# Patient Record
Sex: FEMALE | Race: WHITE
[De-identification: names, ages, dates, MRNs, and addresses within clinical notes are randomized per-mention and may not be internally consistent; named-entity substitution may affect disease eponyms.]

---

## 2017-01-31 ENCOUNTER — HOSPITAL ENCOUNTER (OUTPATIENT)
Dept: HOSPITAL 80 - FLD | Age: 32
Setting detail: OBSERVATION
Discharge: HOME | End: 2017-01-31
Attending: OBSTETRICS & GYNECOLOGY | Admitting: OBSTETRICS & GYNECOLOGY
Payer: MEDICAID

## 2017-01-31 DIAGNOSIS — Z3A.00: ICD-10-CM

## 2017-01-31 DIAGNOSIS — O21.9: Primary | ICD-10-CM

## 2017-01-31 LAB
ABSOLUTE NRBC COUNT: 0 10^3/UL (ref 0–0.01)
ADD DIFF?: YES
ADD MORPH?: NO
ADD SCAN?: NO
ANION GAP SERPL CALC-SCNC: 6 MEQ/L (ref 8–16)
ATYPICAL LYMPHOCYTE FLAG: 30 (ref 0–99)
CALCIUM SERPL-MCNC: 8.8 MG/DL (ref 8.5–10.4)
CHLORIDE SERPL-SCNC: 105 MEQ/L (ref 97–110)
CO2 SERPL-SCNC: 22 MEQ/L (ref 22–31)
CREAT SERPL-MCNC: 0.5 MG/DL (ref 0.6–1)
ERYTHROCYTE [DISTWIDTH] IN BLOOD BY AUTOMATED COUNT: 14.2 % (ref 11.5–15.2)
FRAGMENT RBC FLAG: 0 (ref 0–99)
GFR SERPL CREATININE-BSD FRML MDRD: > 60 ML/MIN/{1.73_M2}
GLUCOSE SERPL-MCNC: 72 MG/DL (ref 70–100)
HCT VFR BLD CALC: 36.3 % (ref 38–47)
HGB BLD-MCNC: 12.4 G/DL (ref 12.6–16.3)
LEFT SHIFT FLG: 20 (ref 0–99)
LIPEMIA HEMOLYSIS FLAG: 90 (ref 0–99)
MCH RBC BLDCO QN: 32.5 PG (ref 27.9–34.1)
MCHC RBC AUTO-ENTMCNC: 34.2 G/DL (ref 32.4–36.7)
MCV RBC AUTO: 95.3 FL (ref 81.5–99.8)
NRBC-AUTO%: 0 % (ref 0–0.2)
PLATELET # BLD EST: ADEQUATE 10*3/UL
PLATELET # BLD: 289 10^3/UL (ref 150–400)
PLATELET CLUMPS FLAG: 10 (ref 0–99)
PMV BLD AUTO: 11.3 FL (ref 8.7–11.7)
POTASSIUM SERPL-SCNC: 3.9 MEQ/L (ref 3.5–5.2)
RBC # BLD AUTO: 3.81 10^6/UL (ref 4.18–5.33)
RBC MORPH BLD: NORMAL
SODIUM SERPL-SCNC: 133 MEQ/L (ref 134–144)

## 2017-01-31 PROCEDURE — G0378 HOSPITAL OBSERVATION PER HR: HCPCS

## 2017-02-15 ENCOUNTER — HOSPITAL ENCOUNTER (INPATIENT)
Dept: HOSPITAL 80 - FLD | Age: 32
LOS: 2 days | Discharge: HOME | End: 2017-02-17
Attending: OBSTETRICS & GYNECOLOGY | Admitting: OBSTETRICS & GYNECOLOGY
Payer: MEDICAID

## 2017-02-15 DIAGNOSIS — O09.43: ICD-10-CM

## 2017-02-15 DIAGNOSIS — Z3A.39: ICD-10-CM

## 2017-02-15 LAB
ABSOLUTE NRBC COUNT: 0 10^3/UL (ref 0–0.01)
ADD DIFF?: YES
ADD MORPH?: NO
ADD SCAN?: NO
ATYPICAL LYMPHOCYTE FLAG: 10 (ref 0–99)
ERYTHROCYTE [DISTWIDTH] IN BLOOD BY AUTOMATED COUNT: 14.2 % (ref 11.5–15.2)
FRAGMENT RBC FLAG: 0 (ref 0–99)
HCT VFR BLD CALC: 38.6 % (ref 38–47)
HGB BLD-MCNC: 13.1 G/DL (ref 12.6–16.3)
LEFT SHIFT FLG: 10 (ref 0–99)
LG PLATELETS BLD QL SMEAR: PRESENT
LIPEMIA HEMOLYSIS FLAG: 90 (ref 0–99)
MACROCYTES: (no result)
MCH RBC BLDCO QN: 32.5 PG (ref 27.9–34.1)
MCHC RBC AUTO-ENTMCNC: 33.9 G/DL (ref 32.4–36.7)
MCV RBC AUTO: 95.8 FL (ref 81.5–99.8)
NRBC-AUTO%: 0 % (ref 0–0.2)
PLATELET # BLD EST: ADEQUATE 10*3/UL
PLATELET # BLD: 357 10^3/UL (ref 150–400)
PLATELET CLUMPS FLAG: 10 (ref 0–99)
PMV BLD AUTO: 11.2 FL (ref 8.7–11.7)
RBC # BLD AUTO: 4.03 10^6/UL (ref 4.18–5.33)

## 2017-02-15 RX ADMIN — IBUPROFEN PRN MG: 600 TABLET ORAL at 19:21

## 2017-02-15 RX ADMIN — IBUPROFEN PRN MG: 600 TABLET ORAL at 11:57

## 2017-02-15 RX ADMIN — DOCUSATE SODIUM PRN MG: 100 CAPSULE, LIQUID FILLED ORAL at 19:21

## 2017-02-15 RX ADMIN — HYDROCODONE BITARTRATE AND ACETAMINOPHEN PRN TAB: 5; 325 TABLET ORAL at 19:21

## 2017-02-15 RX ADMIN — HYDROCODONE BITARTRATE AND ACETAMINOPHEN PRN TAB: 5; 325 TABLET ORAL at 06:16

## 2017-02-15 RX ADMIN — IBUPROFEN PRN MG: 600 TABLET ORAL at 06:16

## 2017-02-15 RX ADMIN — HYDROCODONE BITARTRATE AND ACETAMINOPHEN PRN TAB: 5; 325 TABLET ORAL at 11:57

## 2017-02-15 NOTE — OBPROC
- Labor and Delivery


Onset of Contractions Date: 02/15/17


Onset of Contractions Time: 03:00


Onset of Contractions Type: Spontaneous


Rupture of Membranes Date: 02/15/17


Rupture of Membranes Time: 03:00


Rupture of Membranes Type: Spontaneous


Amniotic Fluid Color: Clear


Dilation Complete Time: 05:12


Delivery Type: Spontaneous


Placenta Delivery Date: 02/15/17


Placenta Delivery Time: 05:40


Episiotomy/Laceration: Other (Specify) (none)


EBL: 400


Complications: None





- Medications


Labor Augmentation/Induction Meds Used: None


Anesthesia: Other (Specify) (none)





-  Info


  ** Infant A


Delivery Date: 02/15/17


Delivery Time: 05:26


Sex of Infant: Male (Michael)


Apgar Score (1 Min): 8


Apgar Score (5 Min): 9

## 2017-02-15 NOTE — GHP
[f 
rep st]



                                                            HISTORY AND PHYSICAL





DATE OF ADMISSION:  02/15/2017 on the obstetrics service.  



HISTORY UPON ADMISSION:  The patient is a 31-year-old G7, P5, A1, who presented 
to Labor and Delivery completely dilated after rapid progression of labor.  The 
patient had spontaneous onset of contractions that she states were pretty mild 
at approximately midnight.  They really increased in intensity approximately 3 
a.m.  The patient remembers having small leakage with coughing at home; however
, no abrupt rupture of membranes.  The intensity increases approximately 3 a.m. 
likely with spontaneous rupture of membranes.  The patient was having some 
bloody show upon admission as well.  On initial exam, the patient was 
completely dilated at +3 station.  Preparations were made for immediate 
delivery.



PREGNANCY COURSE:  The patient has been with [facility] since 11 weeks
' gestation.  The patient's pregnancy has been bothered quite a bit by nausea 
and vomiting, for which the patient has taken Diclegis and Zofran throughout 
much of the pregnancy.  The patient had a 20-week ultrasound, which showed an 
echogenic focus in the left ventricle; however, the patient had verified 
testing and this was negative.  The patient was noted to be anemic in mid 
pregnancy and was advised to take iron; however, did not initiate that.  Her 
hematocrit at 36 weeks was 36%.  The patient's blood type is negative and the 
patient did receive RhoGAM at 28 weeks.  The patient also was taking Zantac for 
heartburn in the pregnancy.  Also, with the heartburn causing nausea, the 
patient was using Zofran occasionally, as well as Phenergan at night.



LABORATORY DATA:  The patient's blood type is A negative with negative antibody 
screen.  RPR nonreactive.  Rubella immune.  Hepatitis B surface antigen 
negative.  HIV negative.  Urinalysis and culture were negative.  Pap smear 
normal.  Gonorrhea and chlamydia were negative.  1-hour Glucola was normal.  
The RhoGAM was given at 28 weeks.  GBS culture was negative.



PAST MEDICAL HISTORY:  Mild asthma and she occasionally uses an inhaler.



PAST SURGICAL HISTORY:  Negative.



OBSTETRIC HISTORY:  In August 2006 a term vaginal delivery of a male at 6 
pounds 8 ounces.  In 2007 a missed AB.  September 2008 term vaginal delivery of 
a male at 8 pounds 13 ounces.  In May 2010 a term vaginal delivery at 8 pounds 
12 ounces.  In October 2012 a term vaginal delivery of a viable female at 8 
pounds 3 ounces.  In January 2015 a term vaginal delivery of a viable female.



ALLERGIES:  The patient has no known drug allergies.



CURRENT MEDICATIONS:  Only prenatal vitamins.



SOCIAL HISTORY:  The patient is , lives with her  Tay who is a 
stay-at-home dad.  The patient is a nonsmoker.  No alcohol or drug use.



PHYSICAL EXAM:  GENERAL:  Upon admission, the patient was in significant 
distress with complete dilation and ready to push.  VITAL SIGNS:  Initial vital 
signs were within the normal limits.  See nursing documentation for details.  
Fetal heart tones showed reactive reassuring fetal heart tones.  Vaginal exam 
as noted above with complete dilation at +3 station.  EXTREMITIES:  Nontender.



ASSESSMENT:  Intrauterine pregnancy at 39+ weeks' gestation, active labor and 
ready for delivery upon admission.  GBS negative.  Grand multiparous.  Negative 
Verifi testing.



PLAN:  Preparations made for immediate delivery.  IV was started for potential 
increased bleeding after delivery.





Job #:  [id number]

MTDD

## 2017-02-16 RX ADMIN — IBUPROFEN PRN MG: 600 TABLET ORAL at 01:55

## 2017-02-16 RX ADMIN — IBUPROFEN PRN MG: 600 TABLET ORAL at 20:41

## 2017-02-16 RX ADMIN — IBUPROFEN PRN MG: 600 TABLET ORAL at 14:31

## 2017-02-16 RX ADMIN — HYDROCODONE BITARTRATE AND ACETAMINOPHEN PRN TAB: 5; 325 TABLET ORAL at 10:13

## 2017-02-16 RX ADMIN — HYDROCODONE BITARTRATE AND ACETAMINOPHEN PRN TAB: 5; 325 TABLET ORAL at 00:22

## 2017-02-16 RX ADMIN — IBUPROFEN PRN MG: 600 TABLET ORAL at 07:52

## 2017-02-16 RX ADMIN — DOCUSATE SODIUM PRN MG: 100 CAPSULE, LIQUID FILLED ORAL at 09:52

## 2017-02-16 RX ADMIN — HYDROCODONE BITARTRATE AND ACETAMINOPHEN PRN TAB: 5; 325 TABLET ORAL at 06:26

## 2017-02-16 RX ADMIN — HYDROCODONE BITARTRATE AND ACETAMINOPHEN PRN TAB: 5; 325 TABLET ORAL at 20:42

## 2017-02-16 NOTE — SOAPPROG
SOAP Progress Note


Assessment/Plan: 


Assessment:





 ppd# 1 s/p 


breast feeding





Plan:


routine post partum care





17 17:37





Subjective: 





patient is doing well. pain is well controlled.   normal lochia.  denies 

headache and changes in vision.   will go home tomorrow after baby is 

circumcised.   


Objective: 





 Vital Signs











Temp Pulse Resp BP Pulse Ox


 


 36.6 C   85   18   119/73   97 


 


 17 09:30  17 09:30  17 09:30  17 09:30  17 09:30








 Laboratory Results





 02/15/17 05:20 











Physical Exam





- Physical Exam


General Appearance: WD/WN, alert, no apparent distress


Respiratory: chest non-tender, lungs clear, normal breath sounds


Cardiac/Chest: normal peripheral pulses, regular rate, rhythm


Abdomen: normal bowel sounds, non-tender, soft, other (fundus firm and non 

tender)


Skin: normal color, warm/dry


Extremities: normal range of motion, non-tender, normal inspection, normal 

capillary refill


Neuro/Psych: no motor/sensory deficits, alert, normal mood/affect, oriented x 3





ICD10 Worksheet


Patient Problems: 


 Problems











Problem Status Onset


 


Spontaneous vaginal delivery Acute

## 2017-02-17 VITALS — TEMPERATURE: 97.8 F | HEART RATE: 82 BPM | SYSTOLIC BLOOD PRESSURE: 129 MMHG | DIASTOLIC BLOOD PRESSURE: 72 MMHG

## 2017-02-17 VITALS — RESPIRATION RATE: 16 BRPM | OXYGEN SATURATION: 99 %

## 2017-02-17 RX ADMIN — HYDROCODONE BITARTRATE AND ACETAMINOPHEN PRN TAB: 5; 325 TABLET ORAL at 11:55

## 2017-02-17 RX ADMIN — IBUPROFEN PRN MG: 600 TABLET ORAL at 11:56

## 2017-02-17 RX ADMIN — HYDROCODONE BITARTRATE AND ACETAMINOPHEN PRN TAB: 5; 325 TABLET ORAL at 02:50

## 2017-02-17 RX ADMIN — HYDROCODONE BITARTRATE AND ACETAMINOPHEN PRN TAB: 5; 325 TABLET ORAL at 07:56

## 2017-02-17 RX ADMIN — IBUPROFEN PRN MG: 600 TABLET ORAL at 06:10

## 2017-02-17 RX ADMIN — DOCUSATE SODIUM PRN MG: 100 CAPSULE, LIQUID FILLED ORAL at 07:56

## 2017-02-17 NOTE — OBPROG
OBG Progress Note


Assessment/Plan: 


Assessment:


30 y/o PPD #2 s/p  doing well.  























Plan:


D/c home with Ibuprofen and Trent.  Follow-up @ [facility] @ 4 and 6 weeks. 


17 09:31





Subjective: 





Pt is doing well today.  She is having good pain control using both Ibuprofen 

and Norco, but feels baby may be too sleepy due to the Norco and is having 

difficulties nursing.  She will blaire off.  Her milk is transitioning and 

otherwise she feels good with min lochis, cristin reg diet and is ready to d/c 

home.  


Objective: 





 





 02/15/17 05:20 





 











Patient ABO/Rh  A NEGATIVE   02/15/17  05:20    








 











Temp Pulse Resp BP Pulse Ox


 


 36.6 C   82   16   129/72 H  99 


 


 17 08:00  17 08:00  17 08:00  17 08:00  17 08:00











Uterine Position/Fundal Height: Umbilicus -2


Uterine Tone: Firm





- Physical Exam


General Appearance: WD/WN, alert, no apparent distress


Neck: non-tender, full range of motion, supple


Respiratory: chest non-tender, lungs clear, normal breath sounds


Cardiac/Chest: regular rate, rhythm


Abdomen: normal bowel sounds


Extremities: swelling (no), Roly's sign (neg)





ICD10 Worksheet


Patient Problems: 


 Problems











Problem Status Onset


 


Spontaneous vaginal delivery Acute

## 2018-03-02 ENCOUNTER — HOSPITAL ENCOUNTER (EMERGENCY)
Dept: HOSPITAL 80 - FED | Age: 33
Discharge: HOME | End: 2018-03-02
Payer: MEDICAID

## 2018-03-02 VITALS — SYSTOLIC BLOOD PRESSURE: 128 MMHG | DIASTOLIC BLOOD PRESSURE: 80 MMHG | RESPIRATION RATE: 18 BRPM | HEART RATE: 87 BPM

## 2018-03-02 VITALS — OXYGEN SATURATION: 94 %

## 2018-03-02 VITALS — TEMPERATURE: 97.5 F

## 2018-03-02 DIAGNOSIS — J45.901: Primary | ICD-10-CM

## 2018-03-02 NOTE — EDPHY
H & P


Stated Complaint: SHORT OF BREATH


Time Seen by Provider: 03/02/18 18:08


HPI/ROS: 





CHIEF COMPLAINT:  Asthma exacerbation





HISTORY OF PRESENT ILLNESS:  Patient is a 32-year-old female who comes to the 

emergency department complaining wheezing and asthma exacerbation.  She states 

that she does not have her albuterol with her.  Her symptoms began around 4 o'

clock.  She has not had a fever cough or runny nose or sore throat.  She denies 

chest pain.








REVIEW OF SYSTEMS:


Constitutional:  denies: chills, fever, recent illness, recent injury


EENTM: denies: blurred vision, double vision, nose congestion


Respiratory:  See HPI


Cardiac: denies: chest pain, irregular heart rate, lightheadedness, palpitations


Gastrointestinal/Abdominal: denies: abdominal pain, diarrhea, nausea, vomiting, 

blood streaked stools


Genitourinary: denies: dysuria, frequency, hematuria, pain


Musculoskeletal: denies: joint pain, muscle pain


Skin: denies: lesions, rash, jaundice, bruising


Neurological: denies: headache, numbness, paresthesia, tingling, dizziness, 

weakness


Hematologic/Lymphatic: denies: blood clots, easy bleeding, easy bruising


Immunologic/allergic: denies: HIV/AIDS, transplant








EXAM:


GENERAL:  Well-appearing, well-nourished and in no acute distress.


HEAD:  Atraumatic, normocephalic.


EYES:  Pupils equal round and reactive to light, extraocular movements intact, 

sclera anicteric, conjunctiva are normal.


ENT:  TMs normal, nares patent, oropharynx clear without exudates.  Moist 

mucous membranes.


NECK:  Normal range of motion, supple without lymphadenopathy or JVD.


LUNGS:  Bilateral wheezing,


HEART:  Regular rate and rhythm without murmurs, rubs or gallops.


ABDOMEN:  Soft, nontender, normoactive bowel sounds.  No guarding, no rebound.  

No masses appreciated.


BACK:  No CVA tenderness, no spinal tenderness, step-offs or deformities


EXTREMITIES:  Normal range of motion, no pitting or edema.  No clubbing or 

cyanosis.


NEUROLOGICAL:  Cranial nerves II through XII grossly intact.  Normal speech, 

normal gait.  5/5 strength, normal movement in all extremities, normal sensation


PSYCH:  Normal mood, normal affect.


SKIN:  Warm, dry, normal turgor, no visible rashes or lesions.








Source: Patient


Exam Limitations: No limitations





- Personal History


LMP (Females 10-55): 22-28 Days Ago


Current Tetanus/Diphtheria Vaccine: Yes


Current Tetanus Diphtheria and Acellular Pertussis (TDAP): Yes


Tetanus Vaccine Date: 10/30/14





- Medical/Surgical History


Hx Asthma: Yes


Hx Chronic Respiratory Disease: No


Hx Diabetes: No


Hx Cardiac Disease: No


Hx Renal Disease: No


Hx Cirrhosis: No


Hx Alcoholism: No


Hx HIV/AIDS: No


Hx Splenectomy or Spleen Trauma: No


Other PMH: childbirth,asthma





- Family History


Significant Family History: No pertinent family hx





- Social History


Smoking Status: Never smoked


Alcohol Use: Sober


Drug Use: None


Constitutional: 


 Initial Vital Signs











Temperature (C)  36.4 C   03/02/18 17:25


 


Heart Rate  102 H  03/02/18 17:25


 


Respiratory Rate  17   03/02/18 17:25


 


Blood Pressure  137/115 H  03/02/18 17:25


 


O2 Sat (%)  100   03/02/18 17:25








 











O2 Delivery Mode               Room Air














Allergies/Adverse Reactions: 


 





No Known Allergies Allergy (Verified 03/09/16 11:17)


 








Home Medications: 














 Medication  Instructions  Recorded


 


Amoxicillin Trihydrate 500 mg PO TID 10 Days  cap 03/09/16





[Amoxicillin]  


 


Hydrocodone/APAP 5/325 [Norco 1 - 2 tab PO Q4HRS PRN #30 tab 02/17/17





5/325 (*)]  


 


Ibuprofen [Motrin (*)] 600 mg PO Q6HRS PRN #30 tab 02/17/17


 


predniSONE 60 mg PO DAILY #9 tab 03/02/18














Medical Decision Making





- Diagnostics


Imaging: Discussed imaging studies w/ On call Radiologist


ED Course/Re-evaluation: 





Patient feels much better after albuterol.  Will give her a take-home as well 

as a 3 day course of steroids.  She is happy and agrees with this plan.


Differential Diagnosis: 





Partial list of the Differential diagnosis considered include but were not 

limited to;  asthma exacerbation, upper respiratory tract infection and 

although unlikely based on the history and physical exam, I also considered 

pneumonia, PE, pneumothorax.  I discussed these differential diagnoses and the 

plan with the patient as well as the usual and expected course.  The patient 

understands that the diagnosis is provisional and that in medicine we are not 

always correct and that further workup is often warranted.  Usual and customary 

warnings were given.  All of the patient's questions were answered.  The 

patient was instructed to return to the emergency department should the 

symptoms at all worsen or return, otherwise to followup with the physician as 

we discussed.





- Data Points


Medications Given: 


 








Discontinued Medications





Albuterol Sulfate (Proventil Inh Prepack)  1 mdi TAKEHOME EDNOW ONE


   Stop: 03/02/18 19:03


   Last Admin: 03/02/18 19:16 Dose:  1 mdi


Albuterol/Ipratropium (Duoneb)  3 ml IH EDNOW ONE


   Stop: 03/02/18 18:22


   Last Admin: 03/02/18 18:34 Dose:  3 ml


Prednisone (Prednisone)  60 mg PO EDNOW ONE


   Stop: 03/02/18 18:35


   Last Admin: 03/02/18 18:36 Dose:  60 mg








Departure





- Departure


Disposition: Home, Routine, Self-Care


Clinical Impression: 


Exacerbation of asthma


Qualifiers:


 Asthma severity: moderate Asthma persistence: unspecified Qualified Code(s): 

J45.901 - Unspecified asthma with (acute) exacerbation





Condition: Fair


Instructions:  Albuterol (By breathing), Asthma (ED)


Referrals: 


NONE *PRIMARY CARE P,. [Primary Care Provider] - As per Instructions


Debbie Stallworth MD [Medical Doctor] - As per Instructions


Prescriptions: 


predniSONE 60 mg PO DAILY #9 tab